# Patient Record
Sex: FEMALE | Race: WHITE
[De-identification: names, ages, dates, MRNs, and addresses within clinical notes are randomized per-mention and may not be internally consistent; named-entity substitution may affect disease eponyms.]

---

## 2018-08-10 ENCOUNTER — HOSPITAL ENCOUNTER (INPATIENT)
Dept: HOSPITAL 62 - LC | Age: 28
LOS: 3 days | Discharge: HOME | End: 2018-08-13
Attending: OBSTETRICS & GYNECOLOGY | Admitting: OBSTETRICS & GYNECOLOGY
Payer: COMMERCIAL

## 2018-08-10 DIAGNOSIS — O48.0: Primary | ICD-10-CM

## 2018-08-10 DIAGNOSIS — F17.201: ICD-10-CM

## 2018-08-10 DIAGNOSIS — E66.9: ICD-10-CM

## 2018-08-10 DIAGNOSIS — Z3A.40: ICD-10-CM

## 2018-08-10 LAB
APPEARANCE UR: CLEAR
APTT PPP: (no result) S
BARBITURATES UR QL SCN: NEGATIVE
BILIRUB UR QL STRIP: NEGATIVE
GLUCOSE UR STRIP-MCNC: NEGATIVE MG/DL
KETONES UR STRIP-MCNC: NEGATIVE MG/DL
METHADONE UR QL SCN: NEGATIVE
NITRITE UR QL STRIP: NEGATIVE
PCP UR QL SCN: NEGATIVE
PH UR STRIP: 6 [PH] (ref 5–9)
PROT UR STRIP-MCNC: NEGATIVE MG/DL
SP GR UR STRIP: 1
URINE AMPHETAMINES SCREEN: NEGATIVE
URINE BENZODIAZEPINES SCREEN: NEGATIVE
URINE COCAINE SCREEN: NEGATIVE
URINE MARIJUANA (THC) SCREEN: NEGATIVE
UROBILINOGEN UR-MCNC: NEGATIVE MG/DL (ref ?–2)

## 2018-08-10 PROCEDURE — 80307 DRUG TEST PRSMV CHEM ANLYZR: CPT

## 2018-08-10 PROCEDURE — 94760 N-INVAS EAR/PLS OXIMETRY 1: CPT

## 2018-08-10 PROCEDURE — 86900 BLOOD TYPING SEROLOGIC ABO: CPT

## 2018-08-10 PROCEDURE — 81005 URINALYSIS: CPT

## 2018-08-10 PROCEDURE — 86592 SYPHILIS TEST NON-TREP QUAL: CPT

## 2018-08-10 PROCEDURE — 84112 EVAL AMNIOTIC FLUID PROTEIN: CPT

## 2018-08-10 PROCEDURE — 36415 COLL VENOUS BLD VENIPUNCTURE: CPT

## 2018-08-10 PROCEDURE — 86901 BLOOD TYPING SEROLOGIC RH(D): CPT

## 2018-08-10 PROCEDURE — 86850 RBC ANTIBODY SCREEN: CPT

## 2018-08-10 PROCEDURE — 85025 COMPLETE CBC W/AUTO DIFF WBC: CPT

## 2018-08-10 PROCEDURE — 85027 COMPLETE CBC AUTOMATED: CPT

## 2018-08-10 RX ADMIN — SODIUM CHLORIDE, SODIUM LACTATE, POTASSIUM CHLORIDE, AND CALCIUM CHLORIDE PRN MLS/HR: .6; .31; .03; .02 INJECTION, SOLUTION INTRAVENOUS at 23:43

## 2018-08-11 LAB
ADD MANUAL DIFF: NO
BASOPHILS # BLD AUTO: 0 10^3/UL (ref 0–0.2)
BASOPHILS NFR BLD AUTO: 0.4 % (ref 0–2)
EOSINOPHIL # BLD AUTO: 0.2 10^3/UL (ref 0–0.6)
EOSINOPHIL NFR BLD AUTO: 1.9 % (ref 0–6)
ERYTHROCYTE [DISTWIDTH] IN BLOOD BY AUTOMATED COUNT: 14.5 % (ref 11.5–14)
HCT VFR BLD CALC: 33.3 % (ref 36–47)
HGB BLD-MCNC: 11.4 G/DL (ref 12–15.5)
LYMPHOCYTES # BLD AUTO: 2.6 10^3/UL (ref 0.5–4.7)
LYMPHOCYTES NFR BLD AUTO: 27.5 % (ref 13–45)
MCH RBC QN AUTO: 29.1 PG (ref 27–33.4)
MCHC RBC AUTO-ENTMCNC: 34.1 G/DL (ref 32–36)
MCV RBC AUTO: 85 FL (ref 80–97)
MONOCYTES # BLD AUTO: 0.5 10^3/UL (ref 0.1–1.4)
MONOCYTES NFR BLD AUTO: 4.9 % (ref 3–13)
NEUTROPHILS # BLD AUTO: 6.1 10^3/UL (ref 1.7–8.2)
NEUTS SEG NFR BLD AUTO: 65.3 % (ref 42–78)
PLATELET # BLD: 216 10^3/UL (ref 150–450)
RBC # BLD AUTO: 3.9 10^6/UL (ref 3.72–5.28)
TOTAL CELLS COUNTED % (AUTO): 100 %
WBC # BLD AUTO: 9.4 10^3/UL (ref 4–10.5)

## 2018-08-11 PROCEDURE — 0HQ9XZZ REPAIR PERINEUM SKIN, EXTERNAL APPROACH: ICD-10-PCS | Performed by: OBSTETRICS & GYNECOLOGY

## 2018-08-11 RX ADMIN — SODIUM CHLORIDE, SODIUM LACTATE, POTASSIUM CHLORIDE, AND CALCIUM CHLORIDE PRN MLS/HR: .6; .31; .03; .02 INJECTION, SOLUTION INTRAVENOUS at 05:48

## 2018-08-11 RX ADMIN — DOCUSATE SODIUM SCH MG: 100 CAPSULE, LIQUID FILLED ORAL at 09:50

## 2018-08-11 RX ADMIN — Medication SCH CAP: at 09:50

## 2018-08-11 RX ADMIN — IBUPROFEN SCH MG: 800 TABLET, FILM COATED ORAL at 21:22

## 2018-08-11 RX ADMIN — IBUPROFEN SCH: 800 TABLET, FILM COATED ORAL at 14:29

## 2018-08-11 RX ADMIN — PENICILLIN G POTASSIUM SCH: 5000000 POWDER, FOR SOLUTION INTRAMUSCULAR; INTRAPLEURAL; INTRATHECAL; INTRAVENOUS at 04:08

## 2018-08-11 RX ADMIN — ACETAMINOPHEN AND CODEINE PHOSPHATE PRN EACH: 300; 30 TABLET ORAL at 12:33

## 2018-08-11 RX ADMIN — FERROUS SULFATE TAB 325 MG (65 MG ELEMENTAL FE) SCH MG: 325 (65 FE) TAB at 09:50

## 2018-08-11 RX ADMIN — SENNOSIDES, DOCUSATE SODIUM SCH EACH: 50; 8.6 TABLET, FILM COATED ORAL at 09:50

## 2018-08-11 RX ADMIN — DOCUSATE SODIUM SCH MG: 100 CAPSULE, LIQUID FILLED ORAL at 17:42

## 2018-08-11 RX ADMIN — FAMOTIDINE SCH MG: 20 TABLET, FILM COATED ORAL at 21:22

## 2018-08-11 RX ADMIN — FERROUS SULFATE TAB 325 MG (65 MG ELEMENTAL FE) SCH MG: 325 (65 FE) TAB at 17:42

## 2018-08-11 RX ADMIN — FAMOTIDINE SCH MG: 20 TABLET, FILM COATED ORAL at 09:50

## 2018-08-11 RX ADMIN — ACETAMINOPHEN AND CODEINE PHOSPHATE PRN EACH: 300; 30 TABLET ORAL at 18:59

## 2018-08-11 RX ADMIN — PENICILLIN G POTASSIUM SCH: 5000000 POWDER, FOR SOLUTION INTRAMUSCULAR; INTRAPLEURAL; INTRATHECAL; INTRAVENOUS at 09:15

## 2018-08-11 NOTE — WARNING SIGNS IN BABIES
=================================================================

VOD Warning Signs

=================================================================

Datetime Report Generated by [facility]: 08/11/2018 07:12

   

VOD#608 -Warning Signs in Babies:  Needs to be viewed.    (08/10/2018

   22:44:Dari Ayoub RN)

## 2018-08-11 NOTE — DELIVERY SUMMARY
=================================================================

Del Sum A-C

=================================================================

Datetime Report Generated by CPN: 2018 08:41

   

   

=================================================================

DELIVERY PERSONNEL

=================================================================

   

DELIVERY PERSONNEL:  [id number]

Delivery Doctor::  Alvarez Oerllana MD

Labor and Delivery Nurse::  Yanely Bowen RN

Labor and Delivery Nurse::  Karime Vizcarra RN

Circulator::  Dari Ayoub RN

Scrub Tech/CNA:  Sabine Washington, CST

   

=================================================================

MATERNAL INFORMATION

=================================================================

   

Delivery Anesthesia:  Epidural

Medications After Delivery:  Pitocin Drip 20 Units/1000ml NSS

Maternal Complications:  None

   

=================================================================

LABOR SUMMARY

=================================================================

   

EDC:  2018 00:00

No. Babies in Womb:  1

 Attempted:  No

Labor Anesthesia:  Epidural

   

=================================================================

LABOR INFORMATION

=================================================================

   

Reason for Induction:  Not Applicable

Onset of Labor:  2018 05:00

Complete Dilatation:  2018 06:35

Oxytocin:  Augmentation

Group B Beta Strep:  Positive

Antibiotics # of Doses:  2

Antibiotics Time of Last Dose:  0407

Name of Antibiotic Given:  PCN

Steroids Given:  None

Reason Steroids Not Administered:  Not Applicable

   

=================================================================

MEMBRANES

=================================================================

   

Membranes Rupture Method:  Spontaneous

Rupture of Membranes:  08/10/2018 21:30

Length of Rupture (hr):  9.38

Amniotic Fluid Color:  Clear

Amniotic Fluid Amount:  Small

Amniotic Fluid Odor:  Normal

   

=================================================================

STAGES OF LABOR

=================================================================

   

Stage 1 hr:  1

Stage 1 min:  35

Stage 2 hr:  0

Stage 2 min:  18

Stage 3 hr:  0

Stage 3 min:  3

Total Time in Labor hr:  1

Total Time in Labor min:  56

   

=================================================================

VAGINAL DELIVERY

=================================================================

   

Episiotomy:  None

Laceration #1:  Perineal

Laceration Extension #1:  First Degree

Laceration Repair:  Yes

Laceration Repair Note:  2-0 vicryl peritoneal tear

Sponge Count Correct:  N/A

Sharps Count Correct:  Yes

   

=================================================================

CSECTION DELIVERY

=================================================================

   

Primary Indication:  N/A

Secondary Indication:  N/A

CSection Incidence:  N/A

Labor:  N/A

Elective:  N/A

CSection Incision:  N/A

   

=================================================================

BABY A INFORMATION

=================================================================

   

Infant Delivery Date/Time:  2018 06:53

Method of Delivery:  Vaginal

Born in Route :  No

:  N/A

Forceps:  N/A

Vacuum Extraction:  N/A

Shoulder Dystocia :  No

   

=================================================================

PRESENTATION/POSITION BABY A

=================================================================

   

Presentation:  Cephalic

Cephalic Presentation:  Vertex

Vertex Position:  OA

Breech Presentation:  N/A

   

=================================================================

PLACENTA INFORMATION BABY A

=================================================================

   

Placenta Delivery Time :  2018 06:56

Placenta Method of Delivery:  Spontaneous

Placenta Status:  Delivered

   

=================================================================

APGAR SCORES BABY A

=================================================================

   

Heart Rate 1 min:  >100 bpm

Resp Effort 1 min:  Good Cry

Reflex Irritability 1 min:  Cough or Sneeze or Pulls Away

Muscle Tone 1 min:  Active Motion

Color 1 min:  Blue/Pale

Resuscitation Effort 1 min:  Tactile Stimulation

APGAR SCORE 1 MIN:  8

Heart Rate 5 min:  >100 bpm

Resp Effort 5 min:  Good Cry

Reflex Irritability 5 min:  Cough or Sneeze or Pulls Away

Muscle Tone 5 min:  Active Motion

Color 5 min:  Blue/Pale

Resuscitation Effort 5 min:  Tactile Stimulation

APGAR SCORE 5 MIN:  8

   

=================================================================

INFANT INFORMATION BABY A

=================================================================

   

Gestational Age at Delivery:  40.3

Gestational Status:  Full Term- 39- 40.6 Weeks

Infant Outcome :  Liveborn

Infant Condition :  Stable

Infant Sex:  Male

   

=================================================================

IDENTIFICATION BABY A

=================================================================

   

Infant Verification Date/Time:  2018 07:07

ID Band Number:  [id number]

Mother's Name Verified:  Yes

Infant Medical Record Number:  [id number]

RN Verifying Infant:  B Yesenia RN

Additional Verifying Personnel:  JOVANNA Cruz RN

   

=================================================================

WEIGHT/LENGTH BABY A

=================================================================

   

Infant Birthweight (gm):  4080

Infant Weight (lb):  9

Infant Weight (oz):  0

Infant Length (in):  20.75

Infant Length (cm):  52.71

   

=================================================================

CORD INFORMATION BABY A

=================================================================

   

No. Cord Vessels:  3

Nuchal Cord :  Loose body cord x 1

Cord Blood Taken:  Yes-For Storage (Mom's Blood type +)

Infant Suction:  Mouth

   

=================================================================

ASSESSMENT BABY A

=================================================================

   

Physical Findings at Delivery:  Molding of the Head; Bruising

Physical Findings- Other:  See full nursery RN assessment.

Infant Respirations:  Intercostal Retractions

Skin to Skin:  Yes

Neonatologist/ALS Called :  No

Infant Care By:  KATHI Chuyitarob RN _ E. Rishabhk RN

   

=================================================================

BABY B INFORMATION

=================================================================

   

 :  N/A

   

=================================================================

SIGNATURES

=================================================================

   

Signature:  Electronically signed by Alvarez Orellana MD ([facility]) on

   2018 at 07:07  with User ID: [email or web address]

## 2018-08-11 NOTE — ADMISSION PHYSICAL
=================================================================



=================================================================

Datetime Report Generated by CPN: 2018 07:05

   

   

=================================================================

CURRENT ADMISSION

=================================================================

   

Chief Complaint:  Uterine Contractions

Indication for Induction:  Not Applicable

Admit Impression :  Term, Intrauterine Pregnancy

Admit Plan:  Admit to Unit

   

=================================================================

ALLERGIES

=================================================================

   

Medication Allergies:  No

Medication Allergies:  No Known Allergies (08/10/2018)

Latex:  No Latex Allergies

Food Allergies:  denies

Environmental Allergies:  seasonal

   

=================================================================

OBSTETRICAL HISTORY

=================================================================

   

EDC:  2018 00:00

:  2

Para:  1

Term:  1

Ectopic:  0

Livin

Cesareans:  0

VBACs:  0

Multiple Births:  0

Gestational Diabetes:  No

Rh Sensitization:  No

Incompetent Cervix:  No

LORENA:  No

Infertility:  No

ART Treatment:  No

Uterine Anomaly:  No

IUGR:  No

Hx Previous C/S:  No

Macrosomia:  No

Hx Loss/Stillborn:  No

PIH:  No

Hx  Death:  No

Placenta Previa/Abruption:  No

Depression/PP Depression:  No

PTL/PROM:  No

Post Partum Hemorrhage:  No

Current Pregnancy Procedures:  Ultrasound

Obstetrical History Comments:  G1: , female, 7# 6oz, @38 weeks

   2015

   G2: current

   

=================================================================

***SEE PRENATAL RECORDS***

=================================================================

   

Alcohol:  No

Marijuana :  No

Cocaine:  No

Other Illicit Drugs:  No

Cigarettes:  Former Smoker. [id number]

   

=================================================================

MEDICAL HISTORY

=================================================================

   

Diabetes:  No

Blood Transfusion:  No

Pulmonary Disease (Asthma, TB):  No

Breast Disease:  No

Hypertension:  No

Gyn Surgery:  No

Heart Disease:  No

Hosp/Surgery:  Yes

Autoimmune Disorder:  No

Anesthetic Complications:  No

Kidney Disease:  No

Abnormal Pap Smear:  No

Neuro/Epilepsy:  No

Psychiatric Disorders:  No

Other Medical Diseases:  No

Hepatitis/Liver Disease:  No

Significant Family History:  No

Varicosities/Phlebitis:  No

Trauma/Violence :  No

Thyroid Dysfunction:  No

Medical History Comments:  hospitalized x 1 for childbirth

   

=================================================================

INFECTIOUS HISTORY

=================================================================

   

Gonorrhea:  No

Genital Herpes:  No

Chlamydia:  No

Tuberculosis:  No

Syphilis:  No

Hepatitis:  No

HIV/AIDS Exposure:  No

Rash or Viral Illness:  No

HPV:  No

   

=================================================================

PHYSICAL EXAM

=================================================================

   

General:  Normal

HEENT:  Normal

Neurologic:  Normal

Thyroid:  Normal

Heart:  Normal

Lungs:  Normal

Breast:  Deferred

Back:  Normal

Abdomen:  Normal

Genitourinary Exam:  Normal

Extremities:  Normal

DTRs:  Normal

Pelvic Type:  Adequate

   

=================================================================

FETUS A

=================================================================

   

EGA:  40.3

   

=================================================================

PLANS FOR LABOR AND DELIVERY

=================================================================

   

Labor and Delivery:  None

Pain Management:  Natural; Medications; Epidural

Feeding Preference:  Breast

Benefit of Breast Feed Discussed:  Yes

Circumcision:  Yes

   

=================================================================

INFORMED CONSENT

=================================================================

   

Signature:  Electronically signed by Alvarez Orellana MD ([email or web address]) on

   2018 at 07:05  with User ID: [email or web address]

## 2018-08-12 LAB
ERYTHROCYTE [DISTWIDTH] IN BLOOD BY AUTOMATED COUNT: 14.6 % (ref 11.5–14)
HCT VFR BLD CALC: 31.1 % (ref 36–47)
HGB BLD-MCNC: 10.8 G/DL (ref 12–15.5)
MCH RBC QN AUTO: 29.9 PG (ref 27–33.4)
MCHC RBC AUTO-ENTMCNC: 34.8 G/DL (ref 32–36)
MCV RBC AUTO: 86 FL (ref 80–97)
PLATELET # BLD: 189 10^3/UL (ref 150–450)
RBC # BLD AUTO: 3.62 10^6/UL (ref 3.72–5.28)
WBC # BLD AUTO: 10 10^3/UL (ref 4–10.5)

## 2018-08-12 RX ADMIN — SENNOSIDES, DOCUSATE SODIUM SCH EACH: 50; 8.6 TABLET, FILM COATED ORAL at 09:18

## 2018-08-12 RX ADMIN — IBUPROFEN SCH MG: 800 TABLET, FILM COATED ORAL at 14:46

## 2018-08-12 RX ADMIN — Medication SCH CAP: at 09:18

## 2018-08-12 RX ADMIN — FAMOTIDINE SCH MG: 20 TABLET, FILM COATED ORAL at 09:18

## 2018-08-12 RX ADMIN — IBUPROFEN SCH MG: 800 TABLET, FILM COATED ORAL at 21:18

## 2018-08-12 RX ADMIN — FAMOTIDINE SCH MG: 20 TABLET, FILM COATED ORAL at 21:18

## 2018-08-12 RX ADMIN — FERROUS SULFATE TAB 325 MG (65 MG ELEMENTAL FE) SCH MG: 325 (65 FE) TAB at 17:11

## 2018-08-12 RX ADMIN — DOCUSATE SODIUM SCH MG: 100 CAPSULE, LIQUID FILLED ORAL at 09:18

## 2018-08-12 RX ADMIN — IBUPROFEN SCH MG: 800 TABLET, FILM COATED ORAL at 05:24

## 2018-08-12 RX ADMIN — DOCUSATE SODIUM SCH MG: 100 CAPSULE, LIQUID FILLED ORAL at 17:11

## 2018-08-12 RX ADMIN — FERROUS SULFATE TAB 325 MG (65 MG ELEMENTAL FE) SCH MG: 325 (65 FE) TAB at 09:18

## 2018-08-12 NOTE — PDOC PROGRESS REPORT
Subjective-OB


Progress Note for:: 08/12/18


Subjective: 


Pt doing well, no concerns.  Reports light bleeding, reg diet and voiding 

without difficulty.





Physical Exam (OB)


Vital Signs: 


 











Temp Pulse Resp BP Pulse Ox


 


 98.8 F   78   16   134/71 H  98 


 


 08/11/18 19:46  08/11/18 19:46  08/11/18 19:46  08/11/18 19:46  08/11/18 19:46








 Intake & Output











 08/11/18 08/12/18 08/13/18





 06:59 06:59 06:59


 


Intake Total 760 2000 


 


Balance 760 2000 


 


Weight 105.3 kg  














- Lochia


Lochia Amount: Scant < 10 ml


Lochia Color: Rubra/Red





- Abdomen


Description: Tender, Soft, Round


Hernia Present: No


Fundal Description: Firm, Midline


Fundal Height: u/u - u/2





Objective-Diagnostic


Laboratory: 


 





 08/12/18 06:58 





 











  08/12/18





  06:58


 


WBC  10.0


 


RBC  3.62 L


 


Hgb  10.8 L


 


Hct  31.1 L


 


MCV  86


 


MCH  29.9


 


MCHC  34.8


 


RDW  14.6 H


 


Plt Count  189














Assessment and Plan(PN)





- Assessment and Plan


(1) Delivery normal


Is this a current diagnosis for this admission?: Yes   





- Time Spent with Patient


Time with patient: Less than 15 minutes


Medications reviewed and adjusted accordingly: Yes





- Disposition


Anticipated Discharge: Home


Within: within 24 hours

## 2018-08-13 VITALS — SYSTOLIC BLOOD PRESSURE: 136 MMHG | DIASTOLIC BLOOD PRESSURE: 70 MMHG

## 2018-08-13 RX ADMIN — FERROUS SULFATE TAB 325 MG (65 MG ELEMENTAL FE) SCH MG: 325 (65 FE) TAB at 10:10

## 2018-08-13 RX ADMIN — FAMOTIDINE SCH MG: 20 TABLET, FILM COATED ORAL at 10:09

## 2018-08-13 RX ADMIN — IBUPROFEN SCH MG: 800 TABLET, FILM COATED ORAL at 06:36

## 2018-08-13 RX ADMIN — Medication SCH CAP: at 10:09

## 2018-08-13 RX ADMIN — DOCUSATE SODIUM SCH MG: 100 CAPSULE, LIQUID FILLED ORAL at 10:09

## 2018-08-13 RX ADMIN — SENNOSIDES, DOCUSATE SODIUM SCH EACH: 50; 8.6 TABLET, FILM COATED ORAL at 10:09

## 2018-08-13 NOTE — PDOC PROGRESS REPORT
Subjective-OB


Progress Note for:: 08/13/18


Subjective: 





c/o seeing floaters in her vision this morning, denies headache. Breastfeeding, 

 at bedside, 





Physical Exam (OB)


Vital Signs: 


 











Temp Pulse Resp BP Pulse Ox


 


 98.1 F   56 L  17   127/67 H  99 


 


 08/13/18 07:57  08/13/18 07:57  08/13/18 07:57  08/13/18 07:57  08/13/18 07:57








 Intake & Output











 08/12/18 08/13/18 08/14/18





 06:59 06:59 06:59


 


Intake Total 2000 400 


 


Balance 2000 400 














- PIH/Pre-Eclampsia


Clonus: Negative


Headache: Present


Epigastric Pain: No


Visual Changes: No - Pt stated seeing spotty vision on 8/11, denies at this time





- Lochia


Lochia Amount: Scant < 10 ml


Lochia Color: Rubra/Red





- Abdomen


Description: Tender, Soft


Hernia Present: No


Fundal Description: Firm, Midline


Fundal Height: u/u - u/2





Objective-Diagnostic


Laboratory: 


 





 08/12/18 06:58 











Assessment and Plan(PN)





- Assessment and Plan


(1) GBS (group B Streptococcus carrier), +RV culture, currently pregnant


Is this a current diagnosis for this admission?: Yes   





(2) Delivery normal


Is this a current diagnosis for this admission?: Yes   





- Time Spent with Patient


Time with patient: Less than 15 minutes


Medications reviewed and adjusted accordingly: Yes





- Disposition


Anticipated Discharge: Home


Within: Other - home today


Disposition: 





stable condition

## 2018-08-13 NOTE — PDOC DISCHARGE SUMMARY
Final Diagnosis


Discharge Date: 18





- Final Diagnosis


(1) GBS (group B Streptococcus carrier), +RV culture, currently pregnant


Is this a current diagnosis for this admission?: Yes   





(2) Delivery normal


Is this a current diagnosis for this admission?: Yes   





Discharge Data





- Discharge Medication


Home Medications: 








Prenatal Vit #76/Iron,Carb/FA [Pnv 29-1 Tablet] 1 tab PO DAILY 11/29/15 


Ibuprofen [Motrin 800 mg Tablet] 800 mg PO Q8  tablet 18 








Gestational Age: 40.3


Reason(s) for Admission: PROM, Group B Strep Positive


Admission Note: 





augmented with Pitocin


Prenatal Procedures: Ultrasound


Intrapartum Procedure(s): Spontaneous Vaginal Delivery


Postpartum Complication(s): Laceration-Perineal


Laceration-Degree: 1st





- [place] Data


  ** Baby 1 Male


Apgar at 1 minute: 8


Apgar at 5 minutes: 8


Weight: 4.082 kg


Home with Mother: Yes


Complications: No





- Diagnosis Test


Laboratory: 


 











Temp Pulse Resp BP Pulse Ox


 


 98.1 F   56 L  17   127/67 H  99 


 


 18 07:57  18 07:57  18 07:57  18 07:57  18 07:57








 











  08/10/18 08/11/18 08/12/18





  23:00 02:43 06:58


 


RBC   3.90  3.62 L


 


Hgb   11.4 L  10.8 L


 


Hct   33.3 L  31.1 L


 


Urine Opiates Screen  NEGATIVE  














- Discharge information/Instructions


Discharge Activity: Activity As Tolerated, No Lifting Over 10 Pounds, Pelvic 

Rest


Discharge Diet: As Tolerated, Regular


Disposition: HOME, SELF-CARE


Follow up with: [facility]


in: 3, Weeks